# Patient Record
Sex: FEMALE | Race: WHITE | NOT HISPANIC OR LATINO | Employment: FULL TIME | URBAN - METROPOLITAN AREA
[De-identification: names, ages, dates, MRNs, and addresses within clinical notes are randomized per-mention and may not be internally consistent; named-entity substitution may affect disease eponyms.]

---

## 2019-07-14 ENCOUNTER — APPOINTMENT (OUTPATIENT)
Dept: RADIOLOGY | Facility: MEDICAL CENTER | Age: 34
End: 2019-07-14
Attending: EMERGENCY MEDICINE
Payer: COMMERCIAL

## 2019-07-14 ENCOUNTER — HOSPITAL ENCOUNTER (EMERGENCY)
Facility: MEDICAL CENTER | Age: 34
End: 2019-07-14
Attending: EMERGENCY MEDICINE
Payer: COMMERCIAL

## 2019-07-14 VITALS
RESPIRATION RATE: 16 BRPM | WEIGHT: 123.4 LBS | HEART RATE: 54 BPM | DIASTOLIC BLOOD PRESSURE: 77 MMHG | OXYGEN SATURATION: 98 % | TEMPERATURE: 97.2 F | SYSTOLIC BLOOD PRESSURE: 134 MMHG | HEIGHT: 60 IN | BODY MASS INDEX: 24.23 KG/M2

## 2019-07-14 DIAGNOSIS — R55 SYNCOPE, UNSPECIFIED SYNCOPE TYPE: ICD-10-CM

## 2019-07-14 LAB
ALBUMIN SERPL BCP-MCNC: 4.5 G/DL (ref 3.2–4.9)
ALBUMIN/GLOB SERPL: 1.6 G/DL
ALP SERPL-CCNC: 41 U/L (ref 30–99)
ALT SERPL-CCNC: 13 U/L (ref 2–50)
ANION GAP SERPL CALC-SCNC: 12 MMOL/L (ref 0–11.9)
AST SERPL-CCNC: 18 U/L (ref 12–45)
BASOPHILS # BLD AUTO: 0.5 % (ref 0–1.8)
BASOPHILS # BLD: 0.05 K/UL (ref 0–0.12)
BILIRUB SERPL-MCNC: 1.3 MG/DL (ref 0.1–1.5)
BUN SERPL-MCNC: 15 MG/DL (ref 8–22)
CALCIUM SERPL-MCNC: 9.3 MG/DL (ref 8.5–10.5)
CHLORIDE SERPL-SCNC: 107 MMOL/L (ref 96–112)
CO2 SERPL-SCNC: 21 MMOL/L (ref 20–33)
CREAT SERPL-MCNC: 0.95 MG/DL (ref 0.5–1.4)
EKG IMPRESSION: NORMAL
EOSINOPHIL # BLD AUTO: 0.04 K/UL (ref 0–0.51)
EOSINOPHIL NFR BLD: 0.4 % (ref 0–6.9)
ERYTHROCYTE [DISTWIDTH] IN BLOOD BY AUTOMATED COUNT: 43 FL (ref 35.9–50)
GLOBULIN SER CALC-MCNC: 2.8 G/DL (ref 1.9–3.5)
GLUCOSE SERPL-MCNC: 121 MG/DL (ref 65–99)
HCT VFR BLD AUTO: 42.7 % (ref 37–47)
HGB BLD-MCNC: 14.2 G/DL (ref 12–16)
IMM GRANULOCYTES # BLD AUTO: 0.03 K/UL (ref 0–0.11)
IMM GRANULOCYTES NFR BLD AUTO: 0.3 % (ref 0–0.9)
LYMPHOCYTES # BLD AUTO: 1.36 K/UL (ref 1–4.8)
LYMPHOCYTES NFR BLD: 12.7 % (ref 22–41)
MCH RBC QN AUTO: 30 PG (ref 27–33)
MCHC RBC AUTO-ENTMCNC: 33.3 G/DL (ref 33.6–35)
MCV RBC AUTO: 90.3 FL (ref 81.4–97.8)
MONOCYTES # BLD AUTO: 0.35 K/UL (ref 0–0.85)
MONOCYTES NFR BLD AUTO: 3.3 % (ref 0–13.4)
NEUTROPHILS # BLD AUTO: 8.86 K/UL (ref 2–7.15)
NEUTROPHILS NFR BLD: 82.8 % (ref 44–72)
NRBC # BLD AUTO: 0 K/UL
NRBC BLD-RTO: 0 /100 WBC
PLATELET # BLD AUTO: 234 K/UL (ref 164–446)
PMV BLD AUTO: 11.7 FL (ref 9–12.9)
POTASSIUM SERPL-SCNC: 3.8 MMOL/L (ref 3.6–5.5)
PROT SERPL-MCNC: 7.3 G/DL (ref 6–8.2)
RBC # BLD AUTO: 4.73 M/UL (ref 4.2–5.4)
SODIUM SERPL-SCNC: 140 MMOL/L (ref 135–145)
TROPONIN T SERPL-MCNC: <6 NG/L (ref 6–19)
WBC # BLD AUTO: 10.7 K/UL (ref 4.8–10.8)

## 2019-07-14 PROCEDURE — 84484 ASSAY OF TROPONIN QUANT: CPT

## 2019-07-14 PROCEDURE — 93005 ELECTROCARDIOGRAM TRACING: CPT

## 2019-07-14 PROCEDURE — 99284 EMERGENCY DEPT VISIT MOD MDM: CPT

## 2019-07-14 PROCEDURE — 80053 COMPREHEN METABOLIC PANEL: CPT

## 2019-07-14 PROCEDURE — 85025 COMPLETE CBC W/AUTO DIFF WBC: CPT

## 2019-07-14 PROCEDURE — 36415 COLL VENOUS BLD VENIPUNCTURE: CPT

## 2019-07-14 PROCEDURE — 93005 ELECTROCARDIOGRAM TRACING: CPT | Performed by: EMERGENCY MEDICINE

## 2019-07-14 PROCEDURE — 71045 X-RAY EXAM CHEST 1 VIEW: CPT

## 2019-07-14 ASSESSMENT — LIFESTYLE VARIABLES: DO YOU DRINK ALCOHOL: NO

## 2019-07-14 NOTE — ED NOTES
Discharge instructions given.  All questions answered.  Pt to follow-up with PCP and cardiologist, return to ER if symptoms worsen as discussed.  Pt verbalized understanding.  All belongings with pt.  Pt ambulated to lobby.

## 2019-07-14 NOTE — ED NOTES
IV access obtained.  Blood drawn and sent to lab.    EKG completed.   Pt reports that since she has had these episodes she reports that she notices that her HR is low and than will go to the 100's.

## 2019-07-14 NOTE — ED PROVIDER NOTES
ED Provider Note    CHIEF COMPLAINT  Chief Complaint   Patient presents with   • Syncope       HPI  Ann Chauhan is a 34 y.o. female who presents to the emergency department after a syncopal episode.  The patient was visiting a family member in the ICU and she suddenly started feeling extremely hot and she remembers her hands and legs feeling heavy and then numb and tingly.  Family members observe her hyperventilating and she then lost consciousness for about 30 seconds and slumped down out of her chair.  She recovered momentarily and was brought to the emergency department for evaluation.  The patient has had 3 or 4 episodes exactly like this over the last 3 years but she has never had an evaluation for these episodes.  She does notice that her heart rate is variable stating that mostly her heart rate is below 60 but sometimes it jumps up to 100.  The patient is visiting from [place].    REVIEW OF SYSTEMS no head injury no headache no neck pain no chest pain no shortness of breath or hemoptysis she did not bite her tongue or have any urinary incontinence.  There was no convulsive activity according to family.  All other systems negative    PAST MEDICAL HISTORY  History reviewed. No pertinent past medical history.    FAMILY HISTORY  History reviewed. No pertinent family history.    SOCIAL HISTORY  Social History     Social History   • Marital status: Single     Spouse name: N/A   • Number of children: N/A   • Years of education: N/A     Social History Main Topics   • Smoking status: Former Smoker   • Smokeless tobacco: Never Used   • Alcohol use Yes      Comment: rarely   • Drug use: Yes      Comment: marijuana   • Sexual activity: Not on file     Other Topics Concern   • Not on file     Social History Narrative   • No narrative on file       SURGICAL HISTORY  History reviewed. No pertinent surgical history.    CURRENT MEDICATIONS  Home Medications     Reviewed by Angelina Hogue R.N. (Registered Nurse)  on 07/14/19 at 0934  Med List Status: Complete   Medication Last Dose Status        Patient Bronson Taking any Medications                       ALLERGIES  Allergies   Allergen Reactions   • Hydrocodone Itching       PHYSICAL EXAM  VITAL SIGNS: /77   Pulse (!) 54   Temp 36.2 °C (97.2 °F) (Temporal)   Resp 16   Ht 1.524 m (5')   Wt 56 kg (123 lb 6.4 oz)   LMP 07/01/2019   SpO2 98%   BMI 24.10 kg/m²    Oxygen saturation is interpreted as adequate  Constitutional: Awake verbal nontoxic-appearing  HENT: No sign of trauma to the head mucous membranes are moist no evidence of tongue biting  Eyes: Pupils round and reactive extraocular motion present no erythema discharge or jaundice  Neck: Trachea midline no JVD  Cardiovascular: Regular rate and rhythm  Lungs: Clear and equal bilaterally with no apparent difficulty breathing  Abdomen/Back: Soft nontender nondistended no rebound guarding or peritoneal findings  Skin: Warm and dry  Musculoskeletal: No acute bony deformity no leg edema or calf tenderness  Neurologic: Awake verbal carrying on a complete lucid conversation with a clear voice and no difficulty, the face moves normally and the patient has good mobility and coordination of the upper and lower extremities    Laboratory  CBC shows white blood cell count of 10.7 hemoglobin is adequate at 14.3 basic metabolic panel is unremarkable LFTs are unremarkable troponin is normal at less than 6    Radiology  DX-CHEST-PORTABLE (1 VIEW)   Final Result      Negative single view of the chest.            EKG interpretation  Twelve-lead EKG shows sinus rhythm 49 beats there is no pathologic ST elevation or depression IA interval is 160 ms the QTc interval is 416 ms    MEDICAL DECISION MAKING and DISPOSITION  In the emergency department an IV was established and the patient was placed on the cardiac monitor.  The patient remains hemodynamically stable, she was given an oral challenge and was able to tolerate oral intake  with no nausea vomiting and she is ambulatory.  At this point in time I think it is safe to discharge her home but she has had several episodes like this over the last 3 years so I have advised her she needs to call her primary care doctor in the morning and arrange office recheck during the week and she may benefit from seeing a cardiologist on an outpatient basis for consultation and potentially an event monitor would be helpful to sort out what is going on during these episodes.  If she has recurrent or new or worsening symptoms she is to return here for recheck    IMPRESSION  1.  Syncopal episode         Electronically signed by: Robert Umanzor, 7/14/2019 4:18 PM

## 2019-07-14 NOTE — ED TRIAGE NOTES
Pt BIB hospital staff.  Pt was in the ICU visiting family and reported that she started to feel hot and that her feet became heavy along with tingling in her upper lip and passed out.  Sister that was with pt reports that pt was out for approx 30 sec.  Pt BS 83 and was given OJ.  Pt reports similar episodes every few months in the last 3 years.  Pt hasn't seen any dr for complaints.  ERP to see.

## 2019-07-14 NOTE — DISCHARGE INSTRUCTIONS
Rest and stay out of the sun drink lots of fluids to improve hydration.  Return here if you have new or worsening symptoms while in [place] otherwise call your primary care doctor tomorrow and arrange office recheck during the week and you may need to arrange follow-up with a cardiologist for an event monitor for further clarification of these episodes.